# Patient Record
Sex: MALE | Race: WHITE
[De-identification: names, ages, dates, MRNs, and addresses within clinical notes are randomized per-mention and may not be internally consistent; named-entity substitution may affect disease eponyms.]

---

## 2021-10-18 ENCOUNTER — HOSPITAL ENCOUNTER (EMERGENCY)
Dept: HOSPITAL 56 - MW.ED | Age: 31
Discharge: HOME | End: 2021-10-18
Payer: COMMERCIAL

## 2021-10-18 DIAGNOSIS — S61.412A: Primary | ICD-10-CM
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
## 2021-10-18 DIAGNOSIS — Z23: ICD-10-CM

## 2021-10-18 NOTE — EDM.PDOC
ED HPI GENERAL MEDICAL PROBLEM





- General


Chief Complaint: Laceration


Stated Complaint: LACERATION ON HAND


Time Seen by Provider: 10/18/21 20:17





- History of Present Illness


INITIAL COMMENTS - FREE TEXT/NARRATIVE: 





HISTORY AND PHYSICAL:





History of present illness:


There is a 31-year-old gentleman who presents ER today from work secondary to an

injury to his left hand.  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 

came off and sliced him over the knuckle of his second third and fourth digits 

of his left hand.  Patient reports his tetanus status is slightly greater than 5

years.  Patient has any recent fevers, shakes, chills, nausea, vomiting, 

diarrhea, seizure, frequency, urgency, chest pain, shortness of breath.  Patient

reports no loss of function of his fingers after the injury.  Patient ports 

bleeding has been controlled with pressure.





Review of systems: 


As per history of present illness and below otherwise all systems reviewed and 

negative.





Past medical history: 


As per history of present illness and as reviewed below otherwise 

noncontributory.





Surgical history: 


As per history of present illness and as reviewed below otherwise 

noncontributory.





Social history: 


No reported history of drug abuse.





Family history: 


As per history of present illness and as reviewed below otherwise 

noncontributory.





Physical exam:


HEENT: Atraumatic, normocephalic, pupils reactive, negative for conjunctival 

pallor or scleral icterus, mucous membranes moist, throat clear, neck supple,


This patient was seen and evaluated during the 2020 SARS-CoV-2 novel coronavirus

pandemic period.  Community viral transmission is ongoing at time of this 

encounter and the emergency department is operating under pandemic response 

procedures.





Constitutional: Patient is oriented to person, place, and time.  Appears 

well-developed and well-nourished.  No distress.


 HEENT: Moist mucous membranes


 Head: Normocephalic and atraumatic


 Eyes: Right eye exhibits no discharge.  Left eye exhibits no discharge.  No 

scleral icterus


 Neck: Normal range of motion.  No tracheal deviation present.


 Cardiovascular: Normal rate and regular rhythm.


 Pulmonary: Effort normal, no respiratory distress.


 Abdominal: No distention


 Musculoskeletal: Normal range of motion


 Neurologic: Alert and oriented to person, place and time.


 Skin: Pink, warm and dry.  


 Psychiatric: Normal mood and affect.  Behavior is normal.  Judgment and thought

content normal.


 Nursing note and vital signs have been reviewed





Patient's ER physical exam is significant for a 5 cm laceration to the MCP 

joints over the second third and fourth digits of his left hand.  Patient is 

neurovascular intact.  Patient is full flexion extension of all his digits of 

his left hand.  All digits were tested together as well as in isolation.  After 

wound was anesthetized and irrigated the wound was evaluated and examined in a 

bloodless field and there was no evidence of any tendon or ligament injury.  

Wound was irrigated with 1 L of NSS high-pressure by Dr. Bender.  Wound was 

anesthetized with 6 cc of 1% lidocaine by Dr. Bender prior to irrigation and 

suturing.





Diagnostics:


[]





Therapeutics:


[]





Assessment and plan:


31-year-old gentleman who presents ER today secondary to a laceration to his 

left hand which was sutured in the ED.  Patient is neurovascular intact.  

Patient has good flexion/extension of all digits.  Wound was irrigated and a 

size and sutured.  No evidence of any tendon or ligament injury.  Patient has no

 point bony tenderness.  Patient will be discharged home with instructions for 

wound check in 2 days and suture removal in 10 days.  Patient is to apply 

Neosporin to the area and to return the ER if any signs of infection.  I have 

discussed with the patient that despite irrigation there is always a possibility

 for infection and to return immediately to the ER or to his primary care 

physician if there is any concerns at all.





Reassessment at the time of disposition demonstrates that the patient is in no 

acute distress.  The patient has remained stable throughout the entire ED visit 

and is without objective evidence for acute process requiring urgent 

intervention or hospitalization. The patient is stable for discharge, counseling

 is provided as documented above, discussed symptomatic treatment and specific 

conditions for return.





I have spoken with the patient/caregiver and discussed todays findings, in 

addition to providing specific details for the plan of care. Questions are 

answered and there is agreement with the plan.





Definitive disposition and diagnosis as appropriate pending reevaluation and 

review of above.








  ** Left Hand


Pain Score (Numeric/FACES): 4





- Related Data


                                    Allergies











Allergy/AdvReac Type Severity Reaction Status Date / Time


 


No Known Allergies Allergy   Verified 10/18/21 20:26











Home Meds: 


                                    Home Meds





. [No Known Home Meds]  10/18/21 [History]











Past Medical History





- Past Health History


Medical/Surgical History: Denies Medical/Surgical History





Social & Family History





- Tobacco Use


Tobacco Use Status *Q: Never Tobacco User


Second Hand Smoke Exposure: No





- Recreational Drug Use


Recreational Drug Use: No





ED ROS GENERAL





- Review of Systems


Review Of Systems: See Below





ED EXAM, SKIN/RASH


Exam: See Below





ED SKIN PROCEDURES





- Laceration/Wound Repair


  ** Left Hand


Appearance: Subcutaneous, Irregular, Clean


Distal NVT: Neuro & Vascular Intact, No Tendon Injury


Anesthetic Type: Local


Local Anesthesia - Lidocaine (Xylocaine): 1% Plain


Local Anesthetic Volume: 4cc


Skin Prep: Saline, Sterile Drape


Saline Irrigation (cc's): 1,000


Exploration/Debridement/Repair: Wound Explored, In a Bloodless Field, Explored 

to Base, No Foreign Material Found, Wound Margins Revised, Multiple Flaps 

Aligned


Closed with: Sutures


Lac/Wound length In cm: 5


Suture Size: 4-0


# of Sutures: 9


Suture Type: Nylon, Interrupted, Simple


Drain Placement: No


Sterile Dressing Applied: Nurse


Tetanus Status Addressed: Yes


Complications: No





Course





- Vital Signs


Last Recorded V/S: 





                                Last Vital Signs











Temp  97.3 F   10/18/21 20:13


 


Pulse  89   10/18/21 20:13


 


Resp  16   10/18/21 20:13


 


BP  129/60   10/18/21 20:13


 


Pulse Ox  98   10/18/21 20:13














- Orders/Labs/Meds


Orders: 





                               Active Orders 24 hr











 Category Date Time Status


 


 Vaccine to be Administered/Admin Charge [RC] ASDIRECTED Care  10/18/21 21:01 

Ordered











Meds: 





Medications














Discontinued Medications














Generic Name Dose Route Start Last Admin





  Trade Name Freq  PRN Reason Stop Dose Admin


 


Bacitracin  1 dose  10/18/21 21:01 





  Bacitracin Oint 1 Gm U/D Packet  TOP  10/18/21 21:02 





  ONETIME ONE  


 


Diphtheria/Tetanus/Acell Pertussis  0.5 ml  10/18/21 21:01 





  Diphtheria,Pertussis(Acell),Tetanus Vaccine 0.5 Ml Syringe  IM  10/18/21 21:02

 





  .ONCE ONE  


 


Lidocaine HCl  Confirm  10/18/21 20:21 





  Lidocaine 1% 5 Ml Sdv  Administered  10/18/21 20:22 





  Dose  





  10 ml  





  .ROUTE  





  .STK-MED ONE  














Departure





- Departure


Time of Disposition: 21:09


Disposition: Home, Self-Care 01


Condition: Good


Clinical Impression: 


Laceration of hand, left


Qualifiers:


 Encounter type: initial encounter Foreign body presence: without foreign body 

Qualified Code(s): S61.412A - Laceration without foreign body of left hand, 

initial encounter








- Discharge Information


Instructions:  Laceration Care, Adult


Referrals: 


Liu Parker MD [Primary Care Provider] - 


Additional Instructions: 


You were seen and evaluated in ER today secondary to a laceration to your left 

hand.  This was sutured in the ER with 9 sutures.  Please make an appointment to

see your Workmen's Comp. comp doctor in 2 days for wound check and suture 

removal in 10 days.  Please return to the ED sooner if you start developing any 

signs or symptoms to be at all concerning for any infection.





[facility]


[address] [place] [id number]


Phone: [phone]


Fax: [phone]








The following information is given to patients seen in the emergency department 

who are being discharged to home. This information is to outline your options 

for follow-up care. We provide all patients seen in our emergency department 

with a follow-up referral.





The need for follow-up, as well as the timing and circumstances, are variable 

depending upon the specifics of your emergency department visit.





If you don't have a primary care physician on staff, we will provide you with a 

referral. We always advise you to contact your personal physician following an 

emergency department visit to inform them of the circumstance of the visit and 

for follow-up with them and/or the need for any referrals to a consulting 

specialist.





The emergency department will also refer you to a specialist when appropriate. 

This referral assures that you have the opportunity for follow-up care with a 

specialist. All of these measure are taken in an effort to provide you with 

optimal care, which includes your follow-up.





Under all circumstances we always encourage you to contact your private 

physician who remains a resource for coordinating your care. When calling for 

follow-up care, please make the office aware that this follow-up is from your 

recent emergency room visit. If for any reason you are refused follow-up, please

contact the [facility] Emergency Department

at [phone] and asked to speak to the emergency department charge nurse.





[facility]


[address] [place] [address]


Phone: [phone]


Fax: [phone]








[facility]


[address] [place] [address]


Phone: [phone]


Fax: [phone]








Sepsis Event Note (ED)





- Evaluation


Sepsis Screening Result: No Definite Risk





- Focused Exam


Vital Signs: 





                                   Vital Signs











  Temp Pulse Resp BP Pulse Ox


 


 10/18/21 20:13  97.3 F  89  16  129/60  98














- My Orders


Last 24 Hours: 





My Active Orders





10/18/21 21:01


Vaccine to be Administered/Admin Charge [RC] ASDIRECTED 














- Assessment/Plan


Last 24 Hours: 





My Active Orders





10/18/21 21:01


Vaccine to be Administered/Admin Charge [RC] ASDIRECTED